# Patient Record
Sex: MALE | Race: WHITE
[De-identification: names, ages, dates, MRNs, and addresses within clinical notes are randomized per-mention and may not be internally consistent; named-entity substitution may affect disease eponyms.]

---

## 2019-02-23 ENCOUNTER — HOSPITAL ENCOUNTER (OUTPATIENT)
Dept: HOSPITAL 47 - EC | Age: 59
Setting detail: OBSERVATION
LOS: 2 days | Discharge: HOME | End: 2019-02-25
Attending: HOSPITALIST | Admitting: HOSPITALIST
Payer: COMMERCIAL

## 2019-02-23 DIAGNOSIS — R07.89: Primary | ICD-10-CM

## 2019-02-23 DIAGNOSIS — Z82.0: ICD-10-CM

## 2019-02-23 DIAGNOSIS — R00.1: ICD-10-CM

## 2019-02-23 DIAGNOSIS — R73.9: ICD-10-CM

## 2019-02-23 DIAGNOSIS — Z81.8: ICD-10-CM

## 2019-02-23 DIAGNOSIS — I10: ICD-10-CM

## 2019-02-23 DIAGNOSIS — I45.10: ICD-10-CM

## 2019-02-23 LAB
ALBUMIN SERPL-MCNC: 4.5 G/DL (ref 3.5–5)
ALP SERPL-CCNC: 66 U/L (ref 38–126)
ALT SERPL-CCNC: 28 U/L (ref 21–72)
ANION GAP SERPL CALC-SCNC: 11 MMOL/L
APTT BLD: 27.8 SEC (ref 22–30)
AST SERPL-CCNC: 20 U/L (ref 17–59)
BASOPHILS # BLD AUTO: 0 K/UL (ref 0–0.2)
BASOPHILS NFR BLD AUTO: 1 %
BUN SERPL-SCNC: 23 MG/DL (ref 9–20)
CALCIUM SPEC-MCNC: 9.9 MG/DL (ref 8.4–10.2)
CHLORIDE SERPL-SCNC: 108 MMOL/L (ref 98–107)
CO2 SERPL-SCNC: 24 MMOL/L (ref 22–30)
D DIMER PPP FEU-MCNC: <0.17 MG/L FEU (ref ?–0.6)
EOSINOPHIL # BLD AUTO: 0.3 K/UL (ref 0–0.7)
EOSINOPHIL NFR BLD AUTO: 5 %
ERYTHROCYTE [DISTWIDTH] IN BLOOD BY AUTOMATED COUNT: 5.24 M/UL (ref 4.3–5.9)
ERYTHROCYTE [DISTWIDTH] IN BLOOD: 12.8 % (ref 11.5–15.5)
GLUCOSE SERPL-MCNC: 148 MG/DL (ref 74–99)
HCT VFR BLD AUTO: 48.6 % (ref 39–53)
HGB BLD-MCNC: 16.2 GM/DL (ref 13–17.5)
INR PPP: 1 (ref ?–1.2)
LYMPHOCYTES # SPEC AUTO: 1.8 K/UL (ref 1–4.8)
LYMPHOCYTES NFR SPEC AUTO: 29 %
MAGNESIUM SPEC-SCNC: 2 MG/DL (ref 1.6–2.3)
MCH RBC QN AUTO: 30.9 PG (ref 25–35)
MCHC RBC AUTO-ENTMCNC: 33.3 G/DL (ref 31–37)
MCV RBC AUTO: 92.8 FL (ref 80–100)
MONOCYTES # BLD AUTO: 0.3 K/UL (ref 0–1)
MONOCYTES NFR BLD AUTO: 5 %
NEUTROPHILS # BLD AUTO: 3.5 K/UL (ref 1.3–7.7)
NEUTROPHILS NFR BLD AUTO: 58 %
PLATELET # BLD AUTO: 146 K/UL (ref 150–450)
POTASSIUM SERPL-SCNC: 3.7 MMOL/L (ref 3.5–5.1)
PROT SERPL-MCNC: 7.2 G/DL (ref 6.3–8.2)
PT BLD: 10.6 SEC (ref 9–12)
SODIUM SERPL-SCNC: 143 MMOL/L (ref 137–145)
WBC # BLD AUTO: 6 K/UL (ref 3.8–10.6)

## 2019-02-23 PROCEDURE — 80048 BASIC METABOLIC PNL TOTAL CA: CPT

## 2019-02-23 PROCEDURE — 71046 X-RAY EXAM CHEST 2 VIEWS: CPT

## 2019-02-23 PROCEDURE — 99291 CRITICAL CARE FIRST HOUR: CPT

## 2019-02-23 PROCEDURE — 85025 COMPLETE CBC W/AUTO DIFF WBC: CPT

## 2019-02-23 PROCEDURE — 96376 TX/PRO/DX INJ SAME DRUG ADON: CPT

## 2019-02-23 PROCEDURE — 82550 ASSAY OF CK (CPK): CPT

## 2019-02-23 PROCEDURE — 85730 THROMBOPLASTIN TIME PARTIAL: CPT

## 2019-02-23 PROCEDURE — 83735 ASSAY OF MAGNESIUM: CPT

## 2019-02-23 PROCEDURE — 80053 COMPREHEN METABOLIC PANEL: CPT

## 2019-02-23 PROCEDURE — 36415 COLL VENOUS BLD VENIPUNCTURE: CPT

## 2019-02-23 PROCEDURE — 85610 PROTHROMBIN TIME: CPT

## 2019-02-23 PROCEDURE — 84484 ASSAY OF TROPONIN QUANT: CPT

## 2019-02-23 PROCEDURE — 96365 THER/PROPH/DIAG IV INF INIT: CPT

## 2019-02-23 PROCEDURE — 96366 THER/PROPH/DIAG IV INF ADDON: CPT

## 2019-02-23 PROCEDURE — 93351 STRESS TTE COMPLETE: CPT

## 2019-02-23 PROCEDURE — 93306 TTE W/DOPPLER COMPLETE: CPT

## 2019-02-23 PROCEDURE — 93005 ELECTROCARDIOGRAM TRACING: CPT

## 2019-02-23 PROCEDURE — 82553 CREATINE MB FRACTION: CPT

## 2019-02-23 PROCEDURE — 85379 FIBRIN DEGRADATION QUANT: CPT

## 2019-02-23 RX ADMIN — HEPARIN SODIUM SCH MLS/HR: 10000 INJECTION, SOLUTION INTRAVENOUS at 23:32

## 2019-02-23 NOTE — XR
EXAM:

  XR Chest, 2 Views

 

CLINICAL HISTORY:

  ITS.REASON XR Reason: Chest Pain

 

TECHNIQUE:

  Frontal and lateral views of the chest.

 

COMPARISON:

  No relevant prior studies available.

 

FINDINGS:

  Lungs:  No consolidation or mass.

  Pleural space:  No effusion.

  Heart:  No cardiomegaly.

  Mediastinum:  Unremarkable.

 

IMPRESSION:     

  No acute cardiopulmonary process.

## 2019-02-23 NOTE — ED
General Adult HPI





- General


Chief complaint: Chest Pain


Stated complaint: Chest pressure


Time Seen by Provider: 02/23/19 21:28


Source: patient, family, RN notes reviewed


Mode of arrival: ambulatory


Limitations: no limitations





- History of Present Illness


Initial comments: 





58-year-old male presenting for evaluation of left-sided chest pain.  Patient 

states he's had intermittent chest pain for the past several days.  This is left

-sided chest pain is worse with some movement but is also worse with exertion.  

Patient has no known history of coronary artery disease.  He is a nonsmoker.  

No diabetes or hypertension history.  He did note that around 3 PM today he had 

some pain extending into his left arm.  Denies nausea or vomiting.  Denies 

abdominal pain.  Denies diaphoresis.  Pain is minimal at this time my 

evaluation.





- Related Data


 Home Medications











 Medication  Instructions  Recorded  Confirmed


 


No Known Home Medications  02/23/19 02/23/19











 Allergies











Allergy/AdvReac Type Severity Reaction Status Date / Time


 


No Known Allergies Allergy   Verified 02/23/19 21:34














Review of Systems


ROS Statement: 


Those systems with pertinent positive or pertinent negative responses have been 

documented in the HPI.





ROS Other: All systems not noted in ROS Statement are negative.





Past Medical History


Past Medical History: Hypertension


History of Any Multi-Drug Resistant Organisms: None Reported


Additional Past Surgical History / Comment(s): hand surgery


Past Psychological History: No Psychological Hx Reported


Smoking Status: Never smoker


Past Alcohol Use History: None Reported


Past Drug Use History: None Reported





General Exam


Limitations: no limitations


General appearance: alert, in no apparent distress


Head exam: Present: atraumatic, normocephalic


Eye exam: Present: normal appearance, PERRL


ENT exam: Present: normal exam


Neck exam: Present: normal inspection.  Absent: tenderness, meningismus


Respiratory exam: Present: normal lung sounds bilaterally.  Absent: respiratory 

distress, wheezes


Cardiovascular Exam: Present: regular rate, normal rhythm


GI/Abdominal exam: Present: soft.  Absent: distended, tenderness


Extremities exam: Present: normal inspection, normal capillary refill.  Absent: 

pedal edema


Neurological exam: Present: alert, oriented X3, CN II-XII intact.  Absent: 

motor sensory deficit


Psychiatric exam: Present: normal affect, normal mood


Skin exam: Present: warm, dry, intact.  Absent: cyanosis, diaphoretic





Course


 Vital Signs











  02/23/19





  21:12


 


Temperature 97.5 F L


 


Pulse Rate 65


 


Respiratory 16





Rate 


 


Blood Pressure 145/89


 


O2 Sat by Pulse 99





Oximetry 














EKG Findings





- EKG Comments:


EKG Findings:: EKG: Sinus bradycardia, left atrial enlargement, right bundle 

branch block, rate of 59, IN interval 188, QRS duration 134, , no ST 

segment elevation.  No old for comparison, patient does report a history of 

right bundle-branch block.





Medical Decision Making





- Medical Decision Making





58-year-old male with intermittent chest pain over the past several days.  

Patient does describe an exertional component to this pain.  No history of CAD.

  EKG shows right bundle branch block, no definitive signs of acute ischemia, 

no ST segment elevation.  Patient has normal CBC, normal CMP, troponin is 

negative.  Chest x-ray negative for acute cardiopulmonary disease.  Patient is 

given aspirin, started on heparin, his symptoms are concerning for unstable 

angina.  He will be admitted to a monitored bed, cardiology placed on consult.





- Lab Data


Result diagrams: 


 02/23/19 21:45





 02/23/19 21:45


 Lab Results











  02/23/19 02/23/19 02/23/19 Range/Units





  21:45 21:45 21:45 


 


WBC  6.0    (3.8-10.6)  k/uL


 


RBC  5.24    (4.30-5.90)  m/uL


 


Hgb  16.2    (13.0-17.5)  gm/dL


 


Hct  48.6    (39.0-53.0)  %


 


MCV  92.8    (80.0-100.0)  fL


 


MCH  30.9    (25.0-35.0)  pg


 


MCHC  33.3    (31.0-37.0)  g/dL


 


RDW  12.8    (11.5-15.5)  %


 


Plt Count  146 L    (150-450)  k/uL


 


Neutrophils %  58    %


 


Lymphocytes %  29    %


 


Monocytes %  5    %


 


Eosinophils %  5    %


 


Basophils %  1    %


 


Neutrophils #  3.5    (1.3-7.7)  k/uL


 


Lymphocytes #  1.8    (1.0-4.8)  k/uL


 


Monocytes #  0.3    (0-1.0)  k/uL


 


Eosinophils #  0.3    (0-0.7)  k/uL


 


Basophils #  0.0    (0-0.2)  k/uL


 


PT    10.6  (9.0-12.0)  sec


 


INR    1.0  (<1.2)  


 


APTT    27.8  (22.0-30.0)  sec


 


D-Dimer    <0.17  (<0.60)  mg/L FEU


 


Sodium   143   (137-145)  mmol/L


 


Potassium   3.7   (3.5-5.1)  mmol/L


 


Chloride   108 H   ()  mmol/L


 


Carbon Dioxide   24   (22-30)  mmol/L


 


Anion Gap   11   mmol/L


 


BUN   23 H   (9-20)  mg/dL


 


Creatinine   1.00   (0.66-1.25)  mg/dL


 


Est GFR (CKD-EPI)AfAm   >90   (>60 ml/min/1.73 sqM)  


 


Est GFR (CKD-EPI)NonAf   83   (>60 ml/min/1.73 sqM)  


 


Glucose   148 H   (74-99)  mg/dL


 


Calcium   9.9   (8.4-10.2)  mg/dL


 


Magnesium   2.0   (1.6-2.3)  mg/dL


 


Total Bilirubin   0.7   (0.2-1.3)  mg/dL


 


AST   20   (17-59)  U/L


 


ALT   28   (21-72)  U/L


 


Alkaline Phosphatase   66   ()  U/L


 


Troponin I     (0.000-0.034)  ng/mL


 


Total Protein   7.2   (6.3-8.2)  g/dL


 


Albumin   4.5   (3.5-5.0)  g/dL














  02/23/19 Range/Units





  21:45 


 


WBC   (3.8-10.6)  k/uL


 


RBC   (4.30-5.90)  m/uL


 


Hgb   (13.0-17.5)  gm/dL


 


Hct   (39.0-53.0)  %


 


MCV   (80.0-100.0)  fL


 


MCH   (25.0-35.0)  pg


 


MCHC   (31.0-37.0)  g/dL


 


RDW   (11.5-15.5)  %


 


Plt Count   (150-450)  k/uL


 


Neutrophils %   %


 


Lymphocytes %   %


 


Monocytes %   %


 


Eosinophils %   %


 


Basophils %   %


 


Neutrophils #   (1.3-7.7)  k/uL


 


Lymphocytes #   (1.0-4.8)  k/uL


 


Monocytes #   (0-1.0)  k/uL


 


Eosinophils #   (0-0.7)  k/uL


 


Basophils #   (0-0.2)  k/uL


 


PT   (9.0-12.0)  sec


 


INR   (<1.2)  


 


APTT   (22.0-30.0)  sec


 


D-Dimer   (<0.60)  mg/L FEU


 


Sodium   (137-145)  mmol/L


 


Potassium   (3.5-5.1)  mmol/L


 


Chloride   ()  mmol/L


 


Carbon Dioxide   (22-30)  mmol/L


 


Anion Gap   mmol/L


 


BUN   (9-20)  mg/dL


 


Creatinine   (0.66-1.25)  mg/dL


 


Est GFR (CKD-EPI)AfAm   (>60 ml/min/1.73 sqM)  


 


Est GFR (CKD-EPI)NonAf   (>60 ml/min/1.73 sqM)  


 


Glucose   (74-99)  mg/dL


 


Calcium   (8.4-10.2)  mg/dL


 


Magnesium   (1.6-2.3)  mg/dL


 


Total Bilirubin   (0.2-1.3)  mg/dL


 


AST   (17-59)  U/L


 


ALT   (21-72)  U/L


 


Alkaline Phosphatase   ()  U/L


 


Troponin I  <0.012  (0.000-0.034)  ng/mL


 


Total Protein   (6.3-8.2)  g/dL


 


Albumin   (3.5-5.0)  g/dL














Critical Care Time


Critical Care Time: Yes


Total Critical Care Time: 35





Disposition


Clinical Impression: 


 Unstable angina pectoris





Disposition: ADMITTED AS IP TO THIS [facility]


Condition: Stable


Is patient prescribed a controlled substance at d/c from ED?: No


Referrals: 


None,Stated [Primary Care Provider] - 1-2 days


Decision to Admit Reason: Admit from EC


Decision Date: 02/23/19


Decision Time: 23:17

## 2019-02-24 VITALS — RESPIRATION RATE: 18 BRPM

## 2019-02-24 LAB
BASOPHILS # BLD AUTO: 0 K/UL (ref 0–0.2)
BASOPHILS NFR BLD AUTO: 1 %
EOSINOPHIL # BLD AUTO: 0.3 K/UL (ref 0–0.7)
EOSINOPHIL NFR BLD AUTO: 5 %
ERYTHROCYTE [DISTWIDTH] IN BLOOD BY AUTOMATED COUNT: 4.31 M/UL (ref 4.3–5.9)
ERYTHROCYTE [DISTWIDTH] IN BLOOD: 12.9 % (ref 11.5–15.5)
GLUCOSE BLD-MCNC: 109 MG/DL (ref 75–99)
HCT VFR BLD AUTO: 40.1 % (ref 39–53)
HGB BLD-MCNC: 14.9 GM/DL (ref 13–17.5)
LYMPHOCYTES # SPEC AUTO: 1.9 K/UL (ref 1–4.8)
LYMPHOCYTES NFR SPEC AUTO: 38 %
MCH RBC QN AUTO: 34.6 PG (ref 25–35)
MCHC RBC AUTO-ENTMCNC: 37.1 G/DL (ref 31–37)
MCV RBC AUTO: 93.1 FL (ref 80–100)
MONOCYTES # BLD AUTO: 0.3 K/UL (ref 0–1)
MONOCYTES NFR BLD AUTO: 6 %
NEUTROPHILS # BLD AUTO: 2.4 K/UL (ref 1.3–7.7)
NEUTROPHILS NFR BLD AUTO: 47 %
PLATELET # BLD AUTO: 116 K/UL (ref 150–450)
TROPONIN I SERPL-MCNC: <0.012 NG/ML (ref 0–0.03)
WBC # BLD AUTO: 5.1 K/UL (ref 3.8–10.6)

## 2019-02-24 RX ADMIN — HEPARIN SODIUM SCH: 10000 INJECTION, SOLUTION INTRAVENOUS at 19:37

## 2019-02-24 NOTE — P.CRDCN
History of Present Illness


Consult date: 02/24/19


Requesting physician: Hannah Damon


Consult reason: chest pain


Chief complaint: Chest pain


History of present illness: 


This is a pleasant 58-year-old  gentleman with no prior documented 

history of hypertension, no diabetes, no hyperlipidemia, he is a nonsmoker, 

rare EtOH, no family history of premature coronary artery disease.  He presents 

to the hospital with symptoms of left-sided chest discomfort which she states 

feels like a bruise in his chest.  It has been there for the past few weeks, he 

states that some of the pain has been there constant for the entire time.  

Chest wall is somewhat tender on palpation.  Patient denies any associated 

shortness of breath, no diaphoresis or nausea.  His initial EKG on presentation 

here showed a sinus bradycardia with a right bundle branch block pattern, 

according to the patient he has been known to have a right bundle branch block 

on prior EKGs.  Chest x-ray is normal.  Blood pressure 108/68, heart rate in 

the 60s, 96% on room air.  White blood cell count is normal, hemoglobin 14.9, 

platelet count 146 on admission, 116 this morning.  D-dimer 0.17.  Sodium 143, 

potassium 3.7, BUN 23 and creatinine 1.0.  Troponin 0.012.  At the time of my 

examination this morning, patient feels well, does have mild tenderness in the 

left chest area on palpation.








Past Medical History


Past Medical History: Hypertension


History of Any Multi-Drug Resistant Organisms: None Reported


Additional Past Surgical History / Comment(s): hand surgery


Past Anesthesia/Blood Transfusion Reactions: No Reported Reaction


Past Psychological History: No Psychological Hx Reported


Smoking Status: Never smoker


Past Alcohol Use History: None Reported


Past Drug Use History: None Reported





- Past Family History


  ** Mother


Family Medical History: Dementia


Additional Family Medical History / Comment(s): PARKINSONS





Medications and Allergies


 Home Medications











 Medication  Instructions  Recorded  Confirmed  Type


 


No Known Home Medications  02/23/19 02/23/19 History











 Allergies











Allergy/AdvReac Type Severity Reaction Status Date / Time


 


No Known Allergies Allergy   Verified 02/23/19 21:34














Physical Exam


Vitals: 


 Vital Signs











  Temp Pulse Pulse Resp BP BP Pulse Ox


 


 02/24/19 02:40  97.8 F   72  17   106/67  96


 


 02/24/19 00:00  97.7 F   61  18   109/71  98


 


 02/23/19 23:30  98.7 F  53 L   16  119/86   99


 


 02/23/19 23:20   54 L   13  124/90   98


 


 02/23/19 23:18  97.7 F   61  18   109/71  98


 


 02/23/19 23:10   53 L   11 L  130/88   99


 


 02/23/19 23:00   56 L   12  125/80   99


 


 02/23/19 22:50   57 L   17  125/80   98


 


 02/23/19 22:40   58 L   18  117/81   99


 


 02/23/19 22:30   58 L   11 L  138/81   99


 


 02/23/19 22:20   55 L   17  138/81   98


 


 02/23/19 22:10   57 L   13    99


 


 02/23/19 22:00   58 L   12    98


 


 02/23/19 21:54   59 L   11 L    98


 


 02/23/19 21:12  97.5 F L  65   16  145/89   99








 Intake and Output











 02/23/19 02/24/19 02/24/19





 22:59 06:59 14:59


 


Intake Total  8.7 72.5


 


Balance  8.7 72.5


 


Intake:   


 


  Intake, IV Titration  8.7 72.5





  Amount   


 


    Heparin Sod,Pork in 0.45%  8.7 72.5





    NaCl 25,000 unit In 0.45   





    % NaCl 1 250ml.bag @ 12   





    UNITS/KG/HR 8.7 mls/hr IV   





    .Q24H [facility] Rx#:[id number]   


 


Other:   


 


  Weight 72.575 kg 72.5 kg 











PHYSICAL EXAMINATION: 





GENERAL: 58-year-old  gentleman in no acute distress at the time of my 

examination





HEENT: Head is atraumatic, normocephalic.  Pupils equal, round.  Sclera 

anicteric. Conjunctiva are clear.  Mucous membranes of the mouth are moist.  

Neck is supple.  There is no elevated jugular venous pressure.  No carotid  

bruit is heard.





HEART EXAMINATION: Heart S1, S2 normal.  No murmur or gallop heard.





CHEST EXAMINATION: Lungs are clear to auscultation and precussion.  Positive  

chest wall tenderness is noted on palpation 





ABDOMEN:  Soft, nontender. Bowel sounds are heard. No organomegaly noted.


 


EXTREMITIES: 2+ peripheral pulses with no evidence of peripheral edema and no 

calf tenderness noted.





NEUROLOGIC patient is awake, alert and oriented 3 .


 


.


 











Results





 02/24/19 04:17





 02/23/19 21:45


 Cardiac Enzymes











  02/23/19 02/23/19 02/24/19 Range/Units





  21:45 21:45 04:17 


 


AST  20    (17-59)  U/L


 


CK-MB (CK-2)    0.3  (0.0-2.4)  ng/mL


 


Troponin I   <0.012   (0.000-0.034)  ng/mL








 Coagulation











  02/23/19 02/24/19 Range/Units





  21:45 04:17 


 


PT  10.6   (9.0-12.0)  sec


 


APTT  27.8  95.6 H  (22.0-30.0)  sec








 CBC











  02/23/19 02/24/19 Range/Units





  21:45 04:17 


 


WBC  6.0  5.1  (3.8-10.6)  k/uL


 


RBC  5.24  4.31  (4.30-5.90)  m/uL


 


Hgb  16.2  14.9  (13.0-17.5)  gm/dL


 


Hct  48.6  40.1  (39.0-53.0)  %


 


Plt Count  146 L  116 L  (150-450)  k/uL








 Comprehensive Metabolic Panel











  02/23/19 Range/Units





  21:45 


 


Sodium  143  (137-145)  mmol/L


 


Potassium  3.7  (3.5-5.1)  mmol/L


 


Chloride  108 H  ()  mmol/L


 


Carbon Dioxide  24  (22-30)  mmol/L


 


BUN  23 H  (9-20)  mg/dL


 


Creatinine  1.00  (0.66-1.25)  mg/dL


 


Glucose  148 H  (74-99)  mg/dL


 


Calcium  9.9  (8.4-10.2)  mg/dL


 


AST  20  (17-59)  U/L


 


ALT  28  (21-72)  U/L


 


Alkaline Phosphatase  66  ()  U/L


 


Total Protein  7.2  (6.3-8.2)  g/dL


 


Albumin  4.5  (3.5-5.0)  g/dL








 Current Medications











Generic Name Dose Route Start Last Admin





  Trade Name Freq  PRN Reason Stop Dose Admin


 


Acetaminophen  650 mg  02/23/19 22:56  





  Tylenol Tab  PO   





  Q6HR PRN   





  Mild Pain or Fever > 100.5   





     





     





     


 


Heparin Sodium (Porcine)  0 unit  02/23/19 23:02  





  Heparin  IV   





  PER PROTOCOL PRN   





  Low PTT   





     





  Protocol   





     


 


Heparin Sodium/Sodium Chloride  250 mls @ 8.7 mls/hr  02/23/19 23:15  02/24/19 

07:52





  25,000 unit/ Sodium Chloride  IV   9.65 units/kg/hr





  .Q24H [facility]   7 mls/hr





     Titration





     





  Protocol   





  12 UNITS/KG/HR   


 


Morphine Sulfate  4 mg  02/23/19 22:56  





  Morphine Sulfate (Inj)  IV   





  Q4HR PRN   





  Severe Pain   





     





     





     


 


Naloxone HCl  0.2 mg  02/23/19 22:56  





  Narcan  IV   





  Q2M PRN   





  Opioid Reversal   





     





     





     


 


Nitroglycerin  0.4 mg  02/23/19 23:02  





  Nitrostat  SUBLINGUAL   





  Q5M PRN   





  Chest Pain   





     





     





     








 Intake and Output











 02/23/19 02/24/19 02/24/19





 22:59 06:59 14:59


 


Intake Total  8.7 72.5


 


Balance  8.7 72.5


 


Intake:   


 


  Intake, IV Titration  8.7 72.5





  Amount   


 


    Heparin Sod,Pork in 0.45%  8.7 72.5





    NaCl 25,000 unit In 0.45   





    % NaCl 1 250ml.bag @ 12   





    UNITS/KG/HR 8.7 mls/hr IV   





    .Q24H [facility] Rx#:[id number]   


 


Other:   


 


  Weight 72.575 kg 72.5 kg 








 





 02/24/19 04:17 





 02/23/19 21:45 











EKG Interpretations (text)





EKG shows a normal sinus rhythm with a right bundle branch block pattern.





Assessment and Plan


Plan: 


Assessment and plan


#1 chest pain, atypical for acute coronary syndrome.  Troponin 1 is negative.  

EKG shows normal sinus rhythm with no acute changes, right bundle branch block 

pattern.  We will obtain 2 subsequent troponins.


#2 cardiac risk factors negative for hypertension, diabetes, hyperlipidemia and 

smoking.








Plan


2 subsequent troponins have been ordered.  We will order an echocardiogram with 

Doppler study and recommend the patient undergo a stress echocardiographic 

study tomorrow.  Discontinue IV heparin if second  troponin is negative.


Further recommendations to follow.








DNP note has been reviewed, I agree with a documented findings and plan of 

care.  Patient was seen and examined.

## 2019-02-25 VITALS — HEART RATE: 77 BPM | DIASTOLIC BLOOD PRESSURE: 91 MMHG | SYSTOLIC BLOOD PRESSURE: 135 MMHG

## 2019-02-25 VITALS — TEMPERATURE: 97.7 F

## 2019-02-25 LAB
ANION GAP SERPL CALC-SCNC: 7 MMOL/L
BASOPHILS # BLD AUTO: 0 K/UL (ref 0–0.2)
BASOPHILS NFR BLD AUTO: 1 %
BUN SERPL-SCNC: 19 MG/DL (ref 9–20)
CALCIUM SPEC-MCNC: 9.8 MG/DL (ref 8.4–10.2)
CHLORIDE SERPL-SCNC: 110 MMOL/L (ref 98–107)
CO2 SERPL-SCNC: 27 MMOL/L (ref 22–30)
EOSINOPHIL # BLD AUTO: 0.2 K/UL (ref 0–0.7)
EOSINOPHIL NFR BLD AUTO: 4 %
ERYTHROCYTE [DISTWIDTH] IN BLOOD BY AUTOMATED COUNT: 5.21 M/UL (ref 4.3–5.9)
ERYTHROCYTE [DISTWIDTH] IN BLOOD: 12.5 % (ref 11.5–15.5)
GLUCOSE BLD-MCNC: 85 MG/DL (ref 75–99)
GLUCOSE BLD-MCNC: 93 MG/DL (ref 75–99)
GLUCOSE SERPL-MCNC: 98 MG/DL (ref 74–99)
HCT VFR BLD AUTO: 48.3 % (ref 39–53)
HGB BLD-MCNC: 16.1 GM/DL (ref 13–17.5)
LYMPHOCYTES # SPEC AUTO: 1.4 K/UL (ref 1–4.8)
LYMPHOCYTES NFR SPEC AUTO: 25 %
MCH RBC QN AUTO: 30.8 PG (ref 25–35)
MCHC RBC AUTO-ENTMCNC: 33.3 G/DL (ref 31–37)
MCV RBC AUTO: 92.6 FL (ref 80–100)
MONOCYTES # BLD AUTO: 0.3 K/UL (ref 0–1)
MONOCYTES NFR BLD AUTO: 5 %
NEUTROPHILS # BLD AUTO: 3.5 K/UL (ref 1.3–7.7)
NEUTROPHILS NFR BLD AUTO: 64 %
PLATELET # BLD AUTO: 155 K/UL (ref 150–450)
POTASSIUM SERPL-SCNC: 5.1 MMOL/L (ref 3.5–5.1)
SODIUM SERPL-SCNC: 144 MMOL/L (ref 137–145)
WBC # BLD AUTO: 5.5 K/UL (ref 3.8–10.6)

## 2019-02-25 NOTE — ECHOS
STRESS ECHOCARDIOGRAM



DATE OF SERVICE:

02/25/2019



INDICATIONS:

Chest pain.



MEDICATIONS:



BASELINE HEART RATE:

89



BASELINE BLOOD PRESSURE:

129/85



MAXIMUM HEART RATE:

158



MAXIMUM BLOOD PRESSURE:

181/78



85% MPHR:

138



100% MPHR:

162



METS:

9.1



MAXIMUM STAGE REACHED:

III



TOTAL EXERCISE TIME:

7 minutes 30 seconds.



CLINICAL INFORMATION:

STRESS DATA: Pretesting physical examination showed a heart rate of 89, pressure is

129/85 mmHg.  Baseline EKG showed sinus mechanism.  The patient exercised on the

treadmill according to [place] protocol for a total of 7 minutes and 30 seconds and

achieved 9.1 METs. Max heart rate was 158 which is about 97% of maximum predicted heart

rate.  Maximum blood pressure was 181/78 mmHg. Clinically the patient did not have any

symptoms of chest pain or discomfort and the EKG did not show any significant ST or T-

wave abnormalities concerning for ischemia.



ECHOCARDIOGRAM IMAGES:  On echocardiogram images from parasternal long axis view,

parasternal short axis view, apical 4 chamber and apical 2 chamber view were obtained

at the baseline images, at the peak of the heart rate as well as on recovery.  The

echocardiogram images did not show any evidence of wall motion abnormalities concerning

for ischemia.



CONCLUSION:

1. Good exercise tolerance.

2. Normal EKG in response to exercise.

3. Normal echocardiogram in response to exercise.





MMODL / IJN: [id number] / Job#: [id number]

## 2019-02-25 NOTE — P.PN
Subjective





This is a pleasant 58-year-old  male with no significant past medical 

history.  He presented to the hospital with symptoms of reproducible chest 

discomfort.  An acute coronary event has been ruled out.  Laboratory data 

reviewed, cardiac enzymes negative. Echocardiogram reviewed, reveals preserved 

LV systolic function with EF 55-60%, mild MR, mild prolapse of the posterior 

mitral valve. Blood pressure 135/91 heart rate 77 afebrile and maintaining 

oxygen saturation on room air. 





GENERAL: Well-appearing, well-nourished and in no acute distress.


NECK: Supple without JVD or thyromegaly.


LUNGS: Breath sounds clear to auscultation bilaterally. Respiration equal and 

unlabored.  No wheezes, rales or rhonchi.


HEART: Regular rate and rhythm without murmurs, rubs or gallops. S1 and S2 

heard.


EXTREMITIES: Normal range of motion, no edema.  No clubbing or cyanosis. 

Peripheral pulses intact.





ASSESSMENT


Chest pain, atypical.  Acute coronary event has been ruled out.





PLAN


Stress test as been obtained and this unremarkable for any stress-induced 

ischemic changes.


Patient is stable for discharge from a cardiac perspective.


Follow up with Dr. Martinez in 2-3 weeks.





Nurse Practitioner note has been reviewed, I agree with a documented findings 

and plan of care.  Patient was seen and examined.








Objective





- Vital Signs


Vital signs: 


 Vital Signs











Temp  97.7 F   02/25/19 12:00


 


Pulse  77   02/25/19 12:00


 


Resp  18   02/25/19 12:00


 


BP  135/91   02/25/19 12:00


 


Pulse Ox  96   02/25/19 12:00








 Intake & Output











 02/24/19 02/25/19 02/25/19





 18:59 06:59 18:59


 


Intake Total 272.5  222


 


Balance 272.5  222


 


Weight   72.575 kg


 


Intake:   


 


  Intake, IV Titration 72.5  





  Amount   


 


    Heparin Sod,Pork in 0.45% 72.5  





    NaCl 25,000 unit In 0.45   





    % NaCl 1 250ml.bag @ 12   





    UNITS/KG/HR 8.7 mls/hr IV   





    .Q24H [facility] Rx#:[id number]   


 


  Oral 200  222


 


Other:   


 


  Voiding Method  Toilet Toilet


 


  # Voids  2 














- Labs


CBC & Chem 7: 


 02/25/19 07:38





 02/25/19 07:38


Labs: 


 Abnormal Lab Results - Last 24 Hours (Table)











  02/24/19 02/25/19 Range/Units





  20:02 07:38 


 


Chloride   110 H  ()  mmol/L


 


POC Glucose (mg/dL)  109 H   (75-99)  mg/dL

## 2019-02-25 NOTE — ECHOF
Referral Reason:chest pain



MEASUREMENTS

--------

HEIGHT: 182.9 cm

WEIGHT: 71.7 kg

BP: 115/69

RVIDd:   2.7 cm     (< 3.3)

IVSd:   0.8 cm     (0.6 - 1.1)

LVIDd:   4.9 cm     (3.9 - 5.3)

LVPWd:   1.0 cm     (0.6 - 1.1)

IVSs:   1.1 cm

LVIDs:   2.8 cm

LVPWs:   1.4 cm

Ao Diam:   3.1 cm     (2.0 - 3.7)

AV Cusp:   1.9 cm     (1.5 - 2.6)

LA Diam:   3.4 cm     (2.7 - 3.8)

MV EXCURSION:   18.829 mm     (> 18.000)

MV EF SLOPE:   63 mm/s     (70 - 150)

EPSS:   0.2 cm

MV E Rogelio:   0.61 m/s

MV DecT:   253 ms

MV A Rogelio:   0.58 m/s

MV E/A Ratio:   1.05 

RAP:   5.00 mmHg

RVSP:   16.07 mmHg







FINDINGS

--------

Sinus rhythm.

This was a technically good study.

LV size, wall thickness and systolic function are normal, with an EF greater than 55%.   The left eleonora
tricular size is normal.   Overall left ventricular systolic function is normal with, an EF between 5
5 - 60 %.

The right ventricle is normal in size.

The left atrial size is normal.

The right atrial size is normal.

The aortic valve is trileaflet, and appears structurally normal. No aortic stenosis or regurgitation.


Mild mitral regurgitation is present.   Mild prolapse of the posterior mitral valve leaflet.

Mild tricuspid regurgitation present.   There is no evidence of pulmonary hypertension.   The right v
entricular systolic pressure, as measured by Doppler, is 16.07mmHg.

There is no pulmonic regurgitation present.

The aortic root size is normal.

There is no pericardial effusion.



CONCLUSIONS

--------

1. LV size, wall thickness and systolic function are normal, with an EF greater than 55%.

2. The left ventricular size is normal.

3. Overall left ventricular systolic function is normal with, an EF between 55 - 60 %.

4. The right ventricle is normal in size.

5. The left atrial size is normal.

6. The right atrial size is normal.

7. The aortic valve is trileaflet, and appears structurally normal. No aortic stenosis or regurgitati
on.

8. Mild mitral regurgitation is present.

9. Mild prolapse of the posterior mitral valve leaflet.

10. Mild tricuspid regurgitation present.

11. There is no evidence of pulmonary hypertension.

12. The right ventricular systolic pressure, as measured by Doppler, is 16.07mmHg.

13. There is no pulmonic regurgitation present.

14. The aortic root size is normal.

15. There is no pericardial effusion.





SONOGRAPHER: Pauline Hermosillo RDCS

## 2019-02-27 NOTE — P.DS
Providers


Date of admission: 


02/23/19 22:58





Expected date of discharge: 02/25/19


Attending physician: 


Hannah Damon





Consults: 





 





02/23/19 22:57


Consult Physician Routine 


   Consulting Provider: Rainer Solis


   Consult Reason/Comments: CP, UA


   Do you want consulting provider notified?: Yes











Primary care physician: 


Stated None





Hospital Course: 


This is a pleasant 58-year-old  gentleman with no prior documented 

history of hypertension, no diabetes, no hyperlipidemia, he is a nonsmoker, 

rare EtOH, no family history of premature coronary artery disease.  He presents 

to the hospital with symptoms of left-sided chest discomfort which she states 

feels like a bruise in his chest.  It has been there for the past few weeks, he 

states that some of the pain has been there constant for the entire time.  

Chest wall is somewhat tender on palpation.  Patient denies any associated 

shortness of breath, no diaphoresis or nausea.  


His initial EKG on presentation here showed a sinus bradycardia with a right 

bundle branch block pattern, according to the patient he has been known to have 

a right bundle branch block on prior EKGs.  Chest x-ray is normal.  


Blood pressure 108/68, heart rate in the 60s, 96% on room air.  White blood 

cell count is normal, hemoglobin 14.9, platelet count 146 on admission, 116 

this morning.  D-dimer 0.17.  Sodium 143, potassium 3.7, BUN 23 and creatinine 

1.0.  Troponin 0.012.  At the time of my examination this morning, patient 

feels well, does have mild tenderness in the left chest area on palpation.


Cardiology saw patient and recommended discontinuing heparin once second set of 

troponin is negative; groups remain negative and heparin was discontinued; 

patient underwent echo with Doppler and stress echocardiography which was 

negative for ischemia and an ejection fraction of greater than 55%





Patient is being discharged home with a plan to follow-up with PCP and 

cardiology as outpatient


Patient Condition at Discharge: Stable





Plan - Discharge Summary


New Discharge Prescriptions: 


Continue


   No Known Home Medications 


Discharge Medication List





No Known Home Medications  02/23/19 [History]








Follow up Appointment(s)/Referral(s): 


None,Stated [Primary Care Provider] - 1-2 days


Letitia Martinez MD [STAFF PHYSICIAN] - 2 Weeks


Patient Instructions/Handouts:  Chest Pain (GEN)


Discharge Disposition: HOME SELF-CARE

## 2022-05-16 ENCOUNTER — HOSPITAL ENCOUNTER (EMERGENCY)
Dept: HOSPITAL 47 - EC | Age: 62
Discharge: HOME | End: 2022-05-16
Payer: COMMERCIAL

## 2022-05-16 VITALS
TEMPERATURE: 98.1 F | HEART RATE: 76 BPM | DIASTOLIC BLOOD PRESSURE: 87 MMHG | SYSTOLIC BLOOD PRESSURE: 149 MMHG | RESPIRATION RATE: 16 BRPM

## 2022-05-16 DIAGNOSIS — Z23: ICD-10-CM

## 2022-05-16 DIAGNOSIS — I10: ICD-10-CM

## 2022-05-16 DIAGNOSIS — S61.251A: Primary | ICD-10-CM
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
## 2022-05-16 PROCEDURE — 90675 RABIES VACCINE IM: CPT

## 2022-05-16 PROCEDURE — 90471 IMMUNIZATION ADMIN: CPT

## 2022-05-16 PROCEDURE — 90375 RABIES IG IM/SC: CPT

## 2022-05-16 PROCEDURE — 96372 THER/PROPH/DIAG INJ SC/IM: CPT

## 2022-05-16 PROCEDURE — 90715 TDAP VACCINE 7 YRS/> IM: CPT

## 2022-05-16 PROCEDURE — 90472 IMMUNIZATION ADMIN EACH ADD: CPT

## 2022-05-16 PROCEDURE — 99283 EMERGENCY DEPT VISIT LOW MDM: CPT

## 2022-05-16 NOTE — ED
Animal Bite HPI





- General


Chief Complaint: Animal Bite


Stated Complaint: Bat Bite on left index finger


Time Seen by Provider: 05/16/22 18:41


Source: patient, RN notes reviewed


Mode of arrival: ambulatory


Limitations: no limitations





- History of Present Illness


Initial Comments: 





This is a pleasant 61-year-old male who presents to emergency department after 

being bitten by a bat on his left index finger.  Patient states this occurred 

early in the afternoon.  Patient finished work and then started thinking that he

should be getting checked out.  There is really no specific wound.  Patient did 

not bleed.  He states he did feel that that despite his finger.  Patient has no 

history of immunosuppression.  Unknown last tetanus.  No other injuries.





No headache, no fever or chills, no changes in vision or hearing, no sore throat

or difficulty with speech, no neck pain, no chest pain or shortness of breath, 

no abdominal pain, no nausea or vomiting, no changes in urination or bowel 

movements, no numbness or tingling, no extremity pain, no skin rashes or 

lesions.





- Related Data


                                Home Medications











 Medication  Instructions  Recorded  Confirmed


 


No Known Home Medications  02/23/19 02/23/19











                                    Allergies











Allergy/AdvReac Type Severity Reaction Status Date / Time


 


No Known Allergies Allergy   Verified 05/16/22 18:40














Review of Systems


ROS Statement: 


Those systems with pertinent positive or pertinent negative responses have been 

documented in the HPI.





ROS Other: All systems not noted in ROS Statement are negative.





Past Medical History


Past Medical History: Hypertension


History of Any Multi-Drug Resistant Organisms: None Reported


Additional Past Surgical History / Comment(s): hand surgery


Past Anesthesia/Blood Transfusion Reactions: No Reported Reaction


Past Psychological History: No Psychological Hx Reported


Smoking Status: Never smoker


Past Alcohol Use History: None Reported


Past Drug Use History: None Reported





- Past Family History


  ** Mother


Family Medical History: Dementia


Additional Family Medical History / Comment(s): PARKINSONS





General Exam


Limitations: no limitations


General appearance: alert, in no apparent distress


Head exam: Present: atraumatic, normocephalic, normal inspection


Eye exam: Present: normal appearance, PERRL, EOMI.  Absent: scleral icterus, 

conjunctival injection, periorbital swelling


ENT exam: Present: normal exam, mucous membranes moist


Neck exam: Present: normal inspection, full ROM.  Absent: tenderness, 

meningismus, lymphadenopathy


Respiratory exam: Present: normal lung sounds bilaterally.  Absent: respiratory 

distress, wheezes, rales, rhonchi, stridor


Cardiovascular Exam: Present: regular rate, normal rhythm, normal heart sounds. 

Absent: systolic murmur, diastolic murmur, rubs, gallop, clicks


Extremities exam: Present: normal inspection, full ROM, normal capillary refill,

other (Patient has no evidence of break in skin integrity.).  Absent: 

tenderness, pedal edema, joint swelling, calf tenderness


Back exam: Present: normal inspection


Neurological exam: Present: alert, oriented X3, CN II-XII intact


Psychiatric exam: Present: normal affect, normal mood


Skin exam: Present: warm, dry, intact, normal color.  Absent: rash





Course


                                   Vital Signs











  05/16/22





  18:40


 


Temperature 98.1 F


 


Pulse Rate 76


 


Respiratory 16





Rate 


 


Blood Pressure 149/87


 


O2 Sat by Pulse 97





Oximetry 














Medical Decision Making





- Medical Decision Making





Patient presenting for rabies prophylaxis and rabies immunoglobulin.  Tetanus 

will be updated as well.  Patient can return to the [facility] for completion

of the series.





Patient was told to return to the ER for any signs or symptoms worsen.  Told to 

return immediately if any other problems arise.  All questions answered.  

Treatment plan discussed.  Patient in agreement


Every effort has been made to ensure accuracy of this dictation.  However, due t

o the limitations of electronic medical records and dictation devices, errors in

charting still occur.








Supervisors Dr. Burton





Disposition


Clinical Impression: 


 Bat bite of finger





Disposition: HOME SELF-CARE


Instructions (If sedation given, give patient instructions):  Animal Bite (ED)


Additional Instructions: 


Return on day 3, 7, and 14 for completion of rabies vaccine series.  Return to 

the [facility].  Make sure you have the prescription with you.





Follow-up with your regular physician as directed.  Return to the ER immediately

if any symptoms worsen, new symptoms arise, or any other problems develop.


Is patient prescribed a controlled substance at d/c from ED?: No


Referrals: 


[facility] [REFERRING] - 05/19/22


Time of Disposition: 20:21

## 2025-04-26 NOTE — P.HPIM
History of Present Illness


H&P Date: 02/24/19





This is a pleasant 58-year-old  gentleman with no prior documented 

history of hypertension, no diabetes, no hyperlipidemia, he is a nonsmoker, 

rare EtOH, no family history of premature coronary artery disease.  He presents 

to the hospital with symptoms of left-sided chest discomfort which she states 

feels like a bruise in his chest.  It has been there for the past few weeks, he 

states that some of the pain has been there constant for the entire time.  

Chest wall is somewhat tender on palpation.  Patient denies any associated 

shortness of breath, no diaphoresis or nausea.  


His initial EKG on presentation here showed a sinus bradycardia with a right 

bundle branch block pattern, according to the patient he has been known to have 

a right bundle branch block on prior EKGs.  Chest x-ray is normal.  


Blood pressure 108/68, heart rate in the 60s, 96% on room air.  White blood 

cell count is normal, hemoglobin 14.9, platelet count 146 on admission, 116 

this morning.  D-dimer 0.17.  Sodium 143, potassium 3.7, BUN 23 and creatinine 

1.0.  Troponin 0.012.  At the time of my examination this morning, patient 

feels well, does have mild tenderness in the left chest area on palpation.








Review of Systems


Constitutional: Denies chills, Denies fever


Eyes: denies blurred vision


Cardiovascular: Reports chest pain, Denies lightheadedness, Denies rapid heart 

beat, Denies shortness of breath


Respiratory: Denies cough, Denies dyspnea


Gastrointestinal: Denies abdominal pain, Denies nausea, Denies vomiting


Neurological: Denies double vision, Denies headaches, Denies weakness





Past Medical History


Past Medical History: Hypertension


History of Any Multi-Drug Resistant Organisms: None Reported


Additional Past Surgical History / Comment(s): hand surgery


Past Anesthesia/Blood Transfusion Reactions: No Reported Reaction


Past Psychological History: No Psychological Hx Reported


Smoking Status: Never smoker


Past Alcohol Use History: None Reported


Past Drug Use History: None Reported





- Past Family History


  ** Mother


Family Medical History: Dementia


Additional Family Medical History / Comment(s): PARKINSONS





Medications and Allergies


 Home Medications











 Medication  Instructions  Recorded  Confirmed  Type


 


No Known Home Medications  02/23/19 02/23/19 History











 Allergies











Allergy/AdvReac Type Severity Reaction Status Date / Time


 


No Known Allergies Allergy   Verified 02/23/19 21:34














Physical Exam


Vitals: 


 Vital Signs











  Temp Pulse Pulse Resp BP BP Pulse Ox


 


 02/24/19 09:03  98.2 F   68  18   115/72  96


 


 02/24/19 02:40  97.8 F   72  17   106/67  96


 


 02/24/19 00:00  97.7 F   61  18   109/71  98


 


 02/23/19 23:30  98.7 F  53 L   16  119/86   99


 


 02/23/19 23:20   54 L   13  124/90   98


 


 02/23/19 23:18  97.7 F   61  18   109/71  98


 


 02/23/19 23:10   53 L   11 L  130/88   99


 


 02/23/19 23:00   56 L   12  125/80   99


 


 02/23/19 22:50   57 L   17  125/80   98


 


 02/23/19 22:40   58 L   18  117/81   99


 


 02/23/19 22:30   58 L   11 L  138/81   99


 


 02/23/19 22:20   55 L   17  138/81   98


 


 02/23/19 22:10   57 L   13    99


 


 02/23/19 22:00   58 L   12    98


 


 02/23/19 21:54   59 L   11 L    98


 


 02/23/19 21:12  97.5 F L  65   16  145/89   99








 Intake and Output











 02/23/19 02/24/19 02/24/19





 22:59 06:59 14:59


 


Intake Total  8.7 72.5


 


Balance  8.7 72.5


 


Intake:   


 


  Intake, IV Titration  8.7 72.5





  Amount   


 


    Heparin Sod,Pork in 0.45%  8.7 72.5





    NaCl 25,000 unit In 0.45   





    % NaCl 1 250ml.bag @ 12   





    UNITS/KG/HR 8.7 mls/hr IV   





    .Q24H [facility] Rx#:[id number]   


 


Other:   


 


  Weight 72.575 kg 72.5 kg 











GENERAL: 58-year-old  gentleman in no acute distress at the time of my 

examination





HEENT: Head is atraumatic, normocephalic.  Pupils equal, round.  Sclera 

anicteric. Conjunctiva are clear.  Mucous membranes of the mouth are moist.  

Neck is supple.  There is no elevated jugular venous pressure.  No carotid  

bruit is heard.





HEART EXAMINATION: Heart S1, S2 normal.  No murmur or gallop heard.





CHEST EXAMINATION: Lungs are clear to auscultation and precussion.  Positive  

chest wall tenderness is noted on palpation 





ABDOMEN:  Soft, nontender. Bowel sounds are heard. No organomegaly noted.


 


EXTREMITIES: 2+ peripheral pulses with no evidence of peripheral edema and no 

calf tenderness noted.





NEUROLOGIC patient is awake, alert and oriented 3 .








Results


CBC & Chem 7: 


 02/24/19 04:17





 02/23/19 21:45


Labs: 


 Abnormal Lab Results - Last 24 Hours (Table)











  02/23/19 02/23/19 02/24/19 Range/Units





  21:45 21:45 04:17 


 


MCHC     (31.0-37.0)  g/dL


 


Plt Count  146 L    (150-450)  k/uL


 


APTT    95.6 H  (22.0-30.0)  sec


 


Chloride   108 H   ()  mmol/L


 


BUN   23 H   (9-20)  mg/dL


 


Glucose   148 H   (74-99)  mg/dL














  02/24/19 Range/Units





  04:17 


 


MCHC  37.1 H  (31.0-37.0)  g/dL


 


Plt Count  116 L  (150-450)  k/uL


 


APTT   (22.0-30.0)  sec


 


Chloride   ()  mmol/L


 


BUN   (9-20)  mg/dL


 


Glucose   (74-99)  mg/dL














Thrombosis Risk Factor Assmnt





- Choose All That Apply


Each Factor Represents 1 point: Age 41-60 years


Thrombosis Risk Factor Assessment Total Risk Factor Score: 1


Thrombosis Risk Factor Assessment Level: Low Risk





Assessment and Plan


Assessment: 


1.  Chest pain/ unstable angina


- admit patient to cardiac telemetry; monitor EKG and trend troponin


- Order 2-D echocardiogram for left ventricular function


- Patient started on IV heparin per protocol and aspirin 325 daily


- We will consult cardiology for further evaluation and treatment





2.  Uncontrolled hypertension


- Patient not on any antihypertensive medications at home


- We will monitor blood pressure closely and and prescribe antihypertensive 

therapy prior to discharge if blood pressure remains elevated





3.  Hyperglycemia; Borderline


- Monitor Accu-Cheks closely if blood sugars remain elevated





4.  DVT prophylaxis; Systemic anticoagulation with IV heparin





CODE STATUS; full code no